# Patient Record
(demographics unavailable — no encounter records)

---

## 2018-08-02 NOTE — XMS REPORT
Caryn Garnett

 Created on:2018



Patient:Caryn Garnett

Sex:Female

:1949

External Reference #:[id number]





Demographics







 Address  [address] [place] [address]

 

 Home Phone  [phone]

 

 Mobile Phone  [phone]

 

 Email Address  [email or web address]

 

 Preferred Language  English

 

 Marital Status  Not  Or 

 

 Anglican Affiliation  Unknown

 

 Race  White

 

 Ethnic Group  Not  Or 









Author







 Organization  [facility]

 

 Address  [address] [place] [address] B



   [place] [address]

 

 Phone  [phone]









Care Team Providers







 Name  Role  Phone

 

 Josesito Rubin DC  Care Team Information   Unavailable

 

 Juanita Smith MD  Primary Care Physician  Unavailable









Payers







 Type  Date  Identification Numbers  Payment Provider  Subscriber

 

 Medicare Primary  Effective:  Policy Number:  Medicare  Caryn Garnett



   2014  [id number]    









 PayID: [id number]  [address]









 [place] [address]









 Commercial    Policy Number: [id number]  [facility]  Caryn Garnett









 Group Number: [id number]  [address]

 

 PayID: [id number]  [place] [address]









 Medigap Part B  Effective: 2014  Policy Number:  BS Facets  Chinmay Garnett



     [id number]    III









 Expires: 2018  PayID: [id number]  [address] [address]









 [place] [address]







Problems







 Date  Description  Provider  Status

 

 Onset: 2011  Obstructive sleep apnea syndrome  Grace Johnston DNP, RN,
  Active



     FNP-BC  

 

 Onset: 2017  Localized, primary osteoarthritis  Eve Gifford M.D.  
Active







Family History







 Date  Family Member(s)  Problem(s)  Comments

 

   General  Cancer  







Social History







 Type  Date  Description  Comments

 

 Lives With      

 

 Occupation    Retired  

 

 ETOH Use    Occasionally consumes alcohol  

 

 Smoking    Patient has never smoked  

 

 Exercise Type/Frequency    Exercises regularly  







Allergies, Adverse Reactions, Alerts







 Date  Description  Reaction  Status  Severity  Comments

 

 2014  NKDA    active    







Medications







 Medication  Date  Status  Form  Strength  Qnty  SIG  Indications  Ordering



                 Provider

 

 Metoprolol  /  Active    50mg    one by    Unknown



 Succinate ER  0000          mouth    



             daily    

 

 Chlorthalidone  /  Active    25mg    one by    Unknown



   0000          mouth    



             daily    

 

 Vesicare  /  Active    10mg    one by    Unknown



   0000          mouth    



             daily    

 

 Atorvastatin  /  Active    20mg    one by    Unknown



 Calcium  0000          mouth    



             daily    

 

 Losartan  /  Active    100mg    one by    Unknown



 Potassium  0000          mouth    



             daily    

 

 Nasonex  /  Active    50mcg    as needed    Unknown



   0000              

 

 Proctosol HC  /  Active    2.5%    as needed    Unknown



   0000              

 

 Omega 3 500  /  Active  Capsules  500mg        Unknown



   0000              

 

 Vitamin D  /  Active  Tablets  1000Unit    by mouth    Unknown



   0000          everyday    

 

 Biotin  /  Active        take one    Unknown



   0000          capsule/ta    



             blet daily    



             by mouth    

 

 Potassium  /  Active  Tablets  75mg    1 by mouth    Unknown



   0000          every day    







Medications Administered in Office







 Medication  Date  Status  Form  Strength  Qnty  SIG  Indications  Ordering



                 Provider

 

 Depomedrol  05/15/2  Administered  Injection          Eve



 40MG  017              TOM Gifford

 

 Technetium TC  05/15/2  Administered  Injection          Ica Nuclear



 99M  017              Schedule



 Tetrofosmin,                



 Per Unit Dose                



 Up To 40                



 Millicuries                

 

 Inj,    Administered  Injection          Red Hoyt



 Regadenoson,  017              Santa,



 0.1 MG                M.D., FACC,



                 FASNC

 

 Technetium TC    Administered  Injection          Red Hoyt



 99M  017              Santa,



 Tetrofosmin,                MKleberDKleber, FACC,



 Per Unit Dose                FASNC



 Up To 40                



 Millicuries                







Vital Signs







 Date  Vital  Result  Comment

 

 2018  Height  66 inches  5'6"









 Weight  252.00 lb  

 

 Heart Rate  60 /min  

 

 BP Systolic Sitting  138 mmHg  

 

 BP Diastolic Sitting  82 mmHg  

 

 Respiratory Rate  14 /min  

 

 O2 % BldC Oximetry  97 %  

 

 BMI (Body Mass Index)  40.7 kg/m2  









 2017  Height  66 inches  5'6"









 Weight  256.00 lb  

 

 Heart Rate  70 /min  

 

 BP Systolic Sitting  138 mmHg  

 

 BP Diastolic Sitting  74 mmHg  

 

 Respiratory Rate  28 /min  

 

 O2 % BldC Oximetry  96 %  room air

 

 BMI (Body Mass Index)  41.3 kg/m2  









 05/15/2017  Height  66 inches  5'6"









 Weight  255.00 lb  

 

 Heart Rate  76 /min  

 

 BP Systolic  154 mmHg  

 

 BP Diastolic  71 mmHg  

 

 Pain Level  2  

 

 BMI (Body Mass Index)  41.2 kg/m2  









 2017  Height  66 inches  5'6"









 Weight  255.00 lb  

 

 Heart Rate  90 /min  

 

 BP Systolic  170 mmHg  

 

 BP Diastolic  80 mmHg  

 

 Body Temperature  97.3 F  

 

 BMI (Body Mass Index)  41.2 kg/m2  









 2016  Height  66 inches  5'6"









 Weight  245.00 lb  

 

 Heart Rate  82 /min  

 

 BP Systolic  148 mmHg  

 

 BP Diastolic  70 mmHg  

 

 Respiratory Rate  14 /min  

 

 O2 % BldC Oximetry  96 %  

 

 BMI (Body Mass Index)  39.5 kg/m2  









 2014  Height  66 inches  5'6"









 Weight  260.00 lb  

 

 Heart Rate  77 /min  

 

 BP Systolic Sitting  140 mmHg  

 

 BP Diastolic Sitting  66 mmHg  

 

 Respiratory Rate  18 /min  

 

 O2 % BldC Oximetry  97 %  

 

 BMI (Body Mass Index)  42.0 kg/m2  









 2014  Height  66 inches  5'6"









 Weight  252.00 lb  

 

 Heart Rate  78 /min  

 

 BP Systolic  164 mmHg  

 

 BP Diastolic  98 mmHg  

 

 BMI (Body Mass Index)  40.7 kg/m2  







Results







 Test  Date  Test  Result  H/L  Range  Note

 

 Laboratory test finding  2017  [facility]  SEE RESULT BELOW      1









 1  SEE RESULT BELOW



   -----------------------------------------------------------------------------
---------------



   Name:  CARYN GARNETT                  : 1949    Attend Dr: Richi Valencia MD



   Acct:  [id number]  Unit: [id number]  AGE: 68            Location:  THYROID



   Re17                        SEX: F             Status:    REG REF



   -----------------------------------------------------------------------------
---------------



   



   SPEC: [id number]            LORI:       SUBM DR: Richi Valencia MD



   REQ:  [id number]             RECD: 



   STATUS: AZRA HARKINS DR: Juanita Goodrich MD



   _



   ORDERED:  FNA-IMG GUID BX, CYTO ADEQ-1ST P



   



   FINAL DIAGNOSIS



   



   



   



   Thyroid, left lower, Ultrasound guided, fine needle



   aspiration:



   --Benign thyroid nodule- involutional type ([place] Class



   II).



   



   



   



   



   



   



   The specimen demonstrates moderate watery proteinaceous fluid,



   a moderate amount of benign appearing follicular epithelium



   arranged in uniform sheets, medium sized follicles and only



   occasional small groups.  Abundant pigmented and non-pigmented



   macrophages are seen in the background.  No features of



   papillary carcinoma are seen.  In this clinical setting the



   risk of malignancy is less than 3%.  Clinical management of



   this thyroid nodule should be based on clinical and



   radiographic features as well as the above findings.



   THYROID LEFT - US GUIDED FINE NEEDLE ASPIRATION LEFT LOWER THYROID NODULE



   



   CLINICAL HISTORY



   



   Left lower thyroid nodule 5.3 x 3.9 x 4.4cm



   



   IMMEDIATE INTERPRETATION



   



   Pass 1-adequate



   



   



   



   



   ** CONTINUED ON NEXT PAGE **



   



   * ML=Testing performed at Main Lab



   DEPARTMENT OF PATHOLOGY,  [address] [place] [address]



   Phone # [phone]      Fax #[phone]



   Olu Quigley M.D. Director     [facility] # [id number]



   



   



   



   RUN DATE: 17               [facility] **LIVE**         
       PAGE    2



   



   -----------------------------------------------------------------------------
---------------



   Patient: CARYN GARNETT                   [id number]     (Continued)



   -----------------------------------------------------------------------------
---------------



   



   GROSS DESCRIPTION          (Continued)



   



   



   GROSS DESCRIPTION



   



   2- alcohol fixed slide(s)



   1- passes



   



   Signed __________(signature on file)___________ Olu Quigley MD  [id number]



   



   -----------------------------------------------------------------------------
---------------



   



   



   



   



   



   



   



   



   



   



   



   



   



   



   



   



   



   



   



   



   



   



   



   



   



   



   



   



   



   



   



   



   



   



   ** END OF REPORT **



   



   * ML=Testing performed at Main Lab



   DEPARTMENT OF PATHOLOGY,  [address] [place] [address]



   Phone # [phone]      Fax #[phone]



   Olu Quigley M.D. Director     [facility] # [id number]







Procedures







 Date  CPT Code  Description  Status

 

 05/15/2017  52379  Inject/Drain Joint/Bursa Major W/O US  Completed

 

 2017  83529  Stress Test  Completed

 

 2017  44422  Myocardial Perfusion Imaging Tomographic (Spect)  Completed



     Multiple Studies  







Encounters







 Type  Date  Location  Provider  CPT E/M  Dx

 

 Office Visit  2017  [facility]  Grace Johnston,  [address]  
G47.33



   9:30a  [facility] RN, ANNIA-BC    

 

 Office Visit  2017  [facility]  Eve Gifford M.D.  [address]  
M25.462



   10:00a  C.M.A.      









 M25.562

 

 M17.12









 Office Visit  2016 11:15a  [facility]  Grace Johnston,  
[address]  G47.33



     [facility]  MAYUR ALMONTE, ANNIA-BC    

 

 Office Visit  2014  3:00p  [facility]  rGace Johnston,  
[address]  327.23



     [facility]  MAYUR ALMONTE, ABIMAEL-BC    

 

 Office Visit  2014 10:00a  [facility]  Eleno Palencia  
[address]  715.36



     C.M.A.  MJANICE    







Plan of Care

2018 - Grace Johnston DNP, RN, [facility]-BCG47.33 Obstructive sleep apnea (
adult) (pediatric)New Orders:Sleep-HomecareComments:Sleep Apnea - 9/23/11 NPSG  
AHI 76.7, janice oxygen 74%On CPAP 10 cm AHI 0.3/hourFollow up:1 
yearRecommendations:Continue PAP device, Benefitting and compliant with 
treatment.  Cleaning Wipe off mask daily (baby wipe-no scent, or warm water) 
Clean mask, tubing, filter, and water chamber weekly in mild no scent dish soap 
and water. Hang to dry.   So-Clean is an option (not covered by insurance)  If 
you have any sleepiness while driving you MUST avoid operating a vehicle or 
machinery. If you have difficulty with your equipment, or need to replace your 
mask or hoses, please contact your homecare agency. A weight change of 20 
pounds or more may have an effect on your equipment; if you are experiencing 
problems please call for an appointment.  If you have any further questions, 
please call the [facility] at [phone] Other abnormalities 
of heart beatRecommendations:New finding, irregular beat every 6-7 beats. Agree 
to be seen in Emergency Room for further evaluation and lab work if needed.  
Educatioon about causes of irregular heart beat: dehydration, caffeine, 
electrolyte imbalances, and other problems that can be detected with lab work.

## 2018-08-02 NOTE — ED
Palpitations / Dysrhythmia





- HPI Summary


HPI Summary: 


This is cristian Sotver documenting for attending Reza Lopez MD. 


   


This patient is a 69 year old F presenting to [facility] with a chief complaint of 

irregular heart rhythm since earlier today. Patient denies chest pain, 

palpitation, SOB or nausea, vomiting, and diarrhea. Patient was at a routine 

appointment to evaluate for a CPAP when the irregular heart rhythm was found. 

Her PCP is Dr. Juanita Smith MD. 





- History of Current Complaint


Chief Complaint: EDDysrhythmPalp


Time Seen by Provider: 08/02/18 14:42


Hx Obtained From: Patient


Onset/Duration: Lasting Hours - Since earlier today


Character: Irregular





- Allergy/Home Medications


Allergies/Adverse Reactions: 


 Allergies











Allergy/AdvReac Type Severity Reaction Status Date / Time


 


No Known Allergies Allergy   Verified 07/10/15 08:25











Home Medications: 


 Home Medications





Ascorbic Acid TAB* [Vitamin C  TAB*] 1,000 mg PO DAILY 08/02/18 [History 

Confirmed 08/02/18]


Biotin 5 mg PO DAILY 08/02/18 [History Confirmed 08/02/18]


Chlorthalidone TAB* [Hygroton TAB*] 25 mg PO DAILY 08/02/18 [History Confirmed 

08/02/18]


Cholecalciferol TAB* [Vitamin D TAB*] 1,000 unit PO DAILY 08/02/18 [History 

Confirmed 08/02/18]


Cinnamon Bark [Cinnamon] 900 mg PO DAILY 08/02/18 [History Confirmed 08/02/18]


Fish Oil/Borage/Flax/Om3,6,9 1 [Omega 3-6-9 Complex Softgel] 750 mg PO DAILY 08/ 02/18 [History Confirmed 08/02/18]


Garlic [Odor Free Garlic] 1 tab PO DAILY 08/02/18 [History Confirmed 08/02/18]


Hydrocortisone [Proctozone-Hc] 2.5 % TOPICAL DAILY 08/02/18 [History Confirmed 

08/02/18]


Losartan TAB* [Cozaar TAB*] 100 mg PO DAILY 08/02/18 [History Confirmed 08/02/18

]


Metoprolol Succinate XL TAB* [Toprol XL TAB*] 50 mg PO DAILY 08/02/18 [History 

Confirmed 08/02/18]


Mometasone NASAL (NF) [Nasonex (NF)] 1 spray BOTH NARES DAILY 08/02/18 [History 

Confirmed 08/02/18]


Multivitamins/Minerals TAB* [Theragran/minerals TAB*] 1 tab PO DAILY 08/02/18 [

History Confirmed 08/02/18]


Potassium 99 mg PO DAILY 08/02/18 [History Confirmed 08/02/18]


Solifenacin(NF) [Vesicare(NF)] 10 mg PO DAILY 08/02/18 [History Confirmed 08/02/ 18]


Tumeric 800 mg PO DAILY 08/02/18 [History Confirmed 08/02/18]











PMH/Surg Hx/FS Hx/Imm Hx


Endocrine/Hematology History: Reports: Hx Thyroid Disease - PARTIAL 

THYROIDECTOMY, 1977


   Denies: Hx Diabetes, Hx Anemia


Cardiovascular History: Reports: Hx Hypertension - WELL CONTROLLED


   Denies: Other Cardiovascular Problems/Disorders


Respiratory History: Reports: Hx Sleep Apnea


GI History: 


   Denies: Hx Jaundice


Musculoskeletal History: Reports: Hx Arthritis - bilateral knees


   Denies: Other Musculoskeletal History


Sensory History: Reports: Hx Contacts or Glasses - GLASSES


   Denies: Hx Hearing Aid


Opthamlomology History: Reports: Hx Contacts or Glasses - GLASSES





- Cancer History


Hx Chemotherapy: No


Hx Radiation Therapy: No





- Surgical History


Surgery Procedure, Year, and Place: PARTIAL THYROIDECTOMY 1977 [facility] [place] [facility].  2009, 2015 

VARICOSE VEINS RIGHT  [facility].  2009, 2014 D/C  [facility]


Hx Anesthesia Reactions: No


Infectious Disease History: No


Infectious Disease History: 


   Denies: Traveled Outside the [place] in Last 30 Days





- Family History


Known Family History: Positive: Hypertension


   Negative: Cardiac Disease, Diabetes





- Social History


Occupation: Retired


Alcohol Use: Rare


Alcohol Amount: 1-2 PER MONTH


Substance Use Type: Reports: None


Smoking Status (MU): Never Smoked Tobacco


Have You Smoked in the Last Year: No





Review of Systems


Negative: Fever, Chills


Negative: Erythema


Negative: Sore Throat


Positive: Other - Irregular rhythm.  Negative: Palpitations, Chest Pain


Negative: Shortness Of Breath, Cough


Negative: Abdominal Pain, Vomiting, Diarrhea, Nausea


Negative: dysuria, hematuria


Negative: Rash


Neurological: Other - Denies dizziness


All Other Systems Reviewed And Are Negative: Yes





Physical Exam





- Summary


Physical Exam Summary: 


General: Well appearing, no distress


Cardiovascular: Skin is well perfused


Pulmonary: No respiratory distress, no tachypnea


Abdomen: Non-distended


Skin: Warm, pink, dry


Psych: Normal affect


Neuro: A&Ox3


Triage Information Reviewed: Yes


Vital Signs On Initial Exam: 


 Initial Vitals











Temp Pulse Resp BP Pulse Ox


 


 97.3 F   83   20   166/78   97 


 


 08/02/18 14:23  08/02/18 14:23  08/02/18 14:23  08/02/18 14:23  08/02/18 14:23











Vital Signs Reviewed: Yes





Diagnostics





- Vital Signs


 Vital Signs











  Temp Pulse Resp BP Pulse Ox


 


 08/02/18 14:59      95


 


 08/02/18 14:54   80  28  166/93  94


 


 08/02/18 14:24   71    95


 


 08/02/18 14:23  97.3 F  83  20  166/78  97














- Laboratory


Result Diagrams: 


 08/02/18 15:24





 08/02/18 15:24


Lab Statement: Any lab studies that have been ordered have been reviewed, and 

results considered in the medical decision making process.





- Radiology


  ** Chest X-Ray


Radiology Interpretation Completed By: Radiologist - HYPERINFLATION. NO ACTIVE 

CARDIOPULMONARY DISEASE. Physician has reviewed this report.





- EKG


  ** No standard instances


Cardiac Rate: NL - 80 BPM


EKG Rhythm: Sinus Rhythm


EKG Interpretation: no STEMI





Course/Dx





- Diagnoses


Provider Diagnoses: 


 PVC (premature ventricular contraction)








Discharge





- Sign-Out/Discharge


Documenting (check all that apply): Patient Departure - DC





- Discharge Plan


Condition: Stable


Disposition: HOME


Patient Education Materials:  Premature Ventricular Contractions (ED)


Referrals: 


Juanita Smith MD [Primary Care Provider] -  (Follow up in 2-3 days)


Additional Instructions: 


Follow up with your primary care physician in 2-3 days. RETURN TO THE EMERGENCY 

DEPARTMENT FOR CHANGING OR WORSENING SYMPTOMS.

## 2018-08-02 NOTE — RAD
HISTORY: ARRHYTHMIA



COMPARISONS: May 21, 2012



VIEWS: 1: frontal portable view of the chest at 3:09 PM



FINDINGS:

LINES AND TUBES: None.

CARDIOMEDIASTINAL SILHOUETTE: The cardiomediastinal silhouette is normal for portable

technique.

PLEURA: The costophrenic angles are sharp. No pleural abnormalities are noted.

LUNG PARENCHYMA: There is hyperinflation.

ABDOMEN: The upper abdomen is clear. There is no subphrenic gas.

BONES AND SOFT TISSUES: No bone or soft tissue abnormalities are noted.



IMPRESSION: HYPERINFLATION. NO ACTIVE CARDIOPULMONARY DISEASE.

## 2018-12-06 NOTE — OP
DATE OF OPERATION:  12/06/18 - [facility]

 

DATE OF BIRTH:  07/23/49

 

SURGEON:  Sean Stewart MD

 

ANESTHESIA:  Spinal.

 

PRE-OP DIAGNOSIS:  Postmenopausal bleeding.

 

POST-OP DIAGNOSIS:  Endometrial polyp.

 

OPERATIVE PROCEDURE:  D and C hysteroscopy and MyoSure.

 

COMPLICATIONS:  None.

 

ESTIMATED BLOOD LOSS:  Less than 50 cc.

 

DEFICIT:  300 cc.

 

FINDINGS:  On exam under anesthesia, uterus was mid position.  Cervix, vagina, 
and vulva appeared normal.  On hysteroscopy, there was a large endometrial 
polyp taking up the majority of the uterine cavity.  The rest of the 
endometrial cavity appeared normal.

 

DESCRIPTION OF PROCEDURE:  The patient identified, procedure identified as a D 
and C, hysteroscopy, MyoSure.  The patient was taken to the operating room, 
prepped and draped in the usual fashion in dorsal lithotomy position under 
spinal anesthesia. Two single-tooth tenaculums were placed in the anterior lip 
of the cervix.  Cervix was dilated up to a #26 Angel dilator.  First the 
diagnostic scope could only be placed and the polyp was visualized.  With 
further dilation, the MyoSure scope was placed and using the MyoSure, the polyp 
was excised.  After the procedure, a sharp curette was inserted, and sharp 
curettage was performed; then the MyoSure was used to take a little bit off the 
endometrium.  At the end of the procedure, all instruments were removed from 
the vagina and good hemostasis was verified.  The patient returned to the 
recovery room in stable condition.  All sponge and instrument counts were 
correct.

 

 [id number]/CPS #: [id number]

MTDD